# Patient Record
Sex: MALE | Race: BLACK OR AFRICAN AMERICAN | NOT HISPANIC OR LATINO | Employment: UNEMPLOYED | URBAN - METROPOLITAN AREA
[De-identification: names, ages, dates, MRNs, and addresses within clinical notes are randomized per-mention and may not be internally consistent; named-entity substitution may affect disease eponyms.]

---

## 2022-02-02 ENCOUNTER — OFFICE VISIT (OUTPATIENT)
Dept: FAMILY MEDICINE | Facility: CLINIC | Age: 32
End: 2022-02-02
Payer: COMMERCIAL

## 2022-02-02 VITALS
BODY MASS INDEX: 40.74 KG/M2 | DIASTOLIC BLOOD PRESSURE: 100 MMHG | SYSTOLIC BLOOD PRESSURE: 144 MMHG | OXYGEN SATURATION: 97 % | WEIGHT: 291 LBS | HEIGHT: 71 IN | HEART RATE: 89 BPM

## 2022-02-02 DIAGNOSIS — J01.90 ACUTE SINUSITIS, RECURRENCE NOT SPECIFIED, UNSPECIFIED LOCATION: Primary | ICD-10-CM

## 2022-02-02 DIAGNOSIS — R03.0 ELEVATED BP WITHOUT DIAGNOSIS OF HYPERTENSION: ICD-10-CM

## 2022-02-02 DIAGNOSIS — F90.0 ATTENTION DEFICIT HYPERACTIVITY DISORDER (ADHD), PREDOMINANTLY INATTENTIVE TYPE: ICD-10-CM

## 2022-02-02 DIAGNOSIS — E66.01 OBESITY, MORBID (MORE THAN 100 LBS OVER IDEAL WEIGHT OR BMI > 40): ICD-10-CM

## 2022-02-02 DIAGNOSIS — F33.1 MODERATE EPISODE OF RECURRENT MAJOR DEPRESSIVE DISORDER: ICD-10-CM

## 2022-02-02 PROCEDURE — 99999 PR PBB SHADOW E&M-NEW PATIENT-LVL III: ICD-10-PCS | Mod: PBBFAC,,, | Performed by: FAMILY MEDICINE

## 2022-02-02 PROCEDURE — 3077F SYST BP >= 140 MM HG: CPT | Mod: CPTII,S$GLB,, | Performed by: FAMILY MEDICINE

## 2022-02-02 PROCEDURE — 99204 PR OFFICE/OUTPT VISIT, NEW, LEVL IV, 45-59 MIN: ICD-10-PCS | Mod: S$GLB,,, | Performed by: FAMILY MEDICINE

## 2022-02-02 PROCEDURE — 1159F MED LIST DOCD IN RCRD: CPT | Mod: CPTII,S$GLB,, | Performed by: FAMILY MEDICINE

## 2022-02-02 PROCEDURE — 3008F BODY MASS INDEX DOCD: CPT | Mod: CPTII,S$GLB,, | Performed by: FAMILY MEDICINE

## 2022-02-02 PROCEDURE — 1160F RVW MEDS BY RX/DR IN RCRD: CPT | Mod: CPTII,S$GLB,, | Performed by: FAMILY MEDICINE

## 2022-02-02 PROCEDURE — 3080F DIAST BP >= 90 MM HG: CPT | Mod: CPTII,S$GLB,, | Performed by: FAMILY MEDICINE

## 2022-02-02 PROCEDURE — 99999 PR PBB SHADOW E&M-NEW PATIENT-LVL III: CPT | Mod: PBBFAC,,, | Performed by: FAMILY MEDICINE

## 2022-02-02 PROCEDURE — 1160F PR REVIEW ALL MEDS BY PRESCRIBER/CLIN PHARMACIST DOCUMENTED: ICD-10-PCS | Mod: CPTII,S$GLB,, | Performed by: FAMILY MEDICINE

## 2022-02-02 PROCEDURE — 1159F PR MEDICATION LIST DOCUMENTED IN MEDICAL RECORD: ICD-10-PCS | Mod: CPTII,S$GLB,, | Performed by: FAMILY MEDICINE

## 2022-02-02 PROCEDURE — 99204 OFFICE O/P NEW MOD 45 MIN: CPT | Mod: S$GLB,,, | Performed by: FAMILY MEDICINE

## 2022-02-02 PROCEDURE — 3077F PR MOST RECENT SYSTOLIC BLOOD PRESSURE >= 140 MM HG: ICD-10-PCS | Mod: CPTII,S$GLB,, | Performed by: FAMILY MEDICINE

## 2022-02-02 PROCEDURE — 3008F PR BODY MASS INDEX (BMI) DOCUMENTED: ICD-10-PCS | Mod: CPTII,S$GLB,, | Performed by: FAMILY MEDICINE

## 2022-02-02 PROCEDURE — 3080F PR MOST RECENT DIASTOLIC BLOOD PRESSURE >= 90 MM HG: ICD-10-PCS | Mod: CPTII,S$GLB,, | Performed by: FAMILY MEDICINE

## 2022-02-02 RX ORDER — AMOXICILLIN AND CLAVULANATE POTASSIUM 875; 125 MG/1; MG/1
1 TABLET, FILM COATED ORAL EVERY 12 HOURS
Qty: 20 TABLET | Refills: 0 | Status: SHIPPED | OUTPATIENT
Start: 2022-02-02 | End: 2022-02-12

## 2022-02-02 RX ORDER — VENLAFAXINE HYDROCHLORIDE 150 MG/1
150 CAPSULE, EXTENDED RELEASE ORAL DAILY
COMMUNITY
End: 2022-07-25

## 2022-02-02 RX ORDER — DEXTROAMPHETAMINE SACCHARATE, AMPHETAMINE ASPARTATE MONOHYDRATE, DEXTROAMPHETAMINE SULFATE AND AMPHETAMINE SULFATE 7.5; 7.5; 7.5; 7.5 MG/1; MG/1; MG/1; MG/1
30 CAPSULE, EXTENDED RELEASE ORAL EVERY MORNING
COMMUNITY
End: 2022-02-02

## 2022-02-02 NOTE — PROGRESS NOTES
"Subjective:       Patient ID: Gerardo Fernández is a 32 y.o. male.    Chief Complaint: Establish Care    Here today to establish care with a new PCP.  He was late to his appointment and was worked back into the schedule.  He moved to the area from [place] to stay with his mother a few months ago. He recently ended a relationship.  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   He was dx as a teenager with ADHD.  He is on Adderall 30 mg BID.  He has a few weeks left of his prescription.  He is on Effexor for depression for the past 4-6 years.  He has generally worked for him, but he is unsure how well.  He did not do well on lexapro in the past.    Review of Systems   Constitutional: Negative for appetite change, fatigue and fever.   HENT: Positive for congestion (x 1 week, he has been using sudafed), ear pain (better today), rhinorrhea, sinus pressure and sore throat.    Respiratory: Negative for cough, shortness of breath and wheezing.    Cardiovascular: Negative for chest pain and palpitations.   Gastrointestinal: Negative for abdominal pain, constipation, diarrhea, nausea and vomiting.   Genitourinary: Negative for difficulty urinating, dysuria, frequency and hematuria.   Neurological: Negative for dizziness, syncope, weakness and headaches.       Objective:      Vitals:    02/02/22 1111 02/02/22 1128   BP: (!) 142/98 (!) 144/100   BP Location: Right arm Right arm   Patient Position: Sitting Sitting   BP Method: Large (Manual) Large (Manual)   Pulse: 89    SpO2: 97%    Weight: 132 kg (291 lb 0.1 oz)    Height: 5' 11" (1.803 m)       Physical Exam    Assessment:       1. Acute sinusitis, recurrence not specified, unspecified location    2. Elevated BP without diagnosis of hypertension    3. Attention deficit hyperactivity disorder (ADHD), predominantly inattentive type    4. Obesity, morbid (more than 100 lbs over ideal weight or BMI > 40)  "   5. Moderate episode of recurrent major depressive disorder        Plan:       Acute sinusitis, recurrence not specified, unspecified location  -     amoxicillin-clavulanate 875-125mg (AUGMENTIN) 875-125 mg per tablet; Take 1 tablet by mouth every 12 (twelve) hours. for 10 days  Dispense: 20 tablet; Refill: 0  Start Flonase and Mucinex.  Elevated BP without diagnosis of hypertension  Stop sudafed.  Reduce Salt.  Increase CV exercise.  Attention deficit hyperactivity disorder (ADHD), predominantly inattentive type  Stable on Medication.  Does not need refill  Obesity, morbid (more than 100 lbs over ideal weight or BMI > 40)    Moderate episode of recurrent major depressive disorder  Feels like Effexor is working for him at this time  F/U in a couple weeks to recheck BP and refill prescriptions if needed.      Medication List with Changes/Refills   New Medications    AMOXICILLIN-CLAVULANATE 875-125MG (AUGMENTIN) 875-125 MG PER TABLET    Take 1 tablet by mouth every 12 (twelve) hours. for 10 days   Current Medications    VENLAFAXINE (EFFEXOR-XR) 150 MG CP24    Take 150 mg by mouth once daily.   Discontinued Medications    DEXTROAMPHETAMINE-AMPHETAMINE (ADDERALL XR) 30 MG 24 HR CAPSULE    Take 30 mg by mouth every morning.

## 2022-07-11 ENCOUNTER — TELEPHONE (OUTPATIENT)
Dept: FAMILY MEDICINE | Facility: CLINIC | Age: 32
End: 2022-07-11
Payer: COMMERCIAL

## 2022-07-11 RX ORDER — DEXTROAMPHETAMINE SACCHARATE, AMPHETAMINE ASPARTATE, DEXTROAMPHETAMINE SULFATE AND AMPHETAMINE SULFATE 7.5; 7.5; 7.5; 7.5 MG/1; MG/1; MG/1; MG/1
1 TABLET ORAL 2 TIMES DAILY
COMMUNITY
Start: 2022-06-13 | End: 2023-11-08

## 2022-07-11 NOTE — TELEPHONE ENCOUNTER
Spoke to pt. Informed him that he needs a f/u for Adderall refill. He has new insurance and wanted to know if the visit will be approved. Finance dept number was provided.     --Pt also asked if  will push his visit for 6 month instead of 3 months. I explained we have to have a 3 month f/u for Adderall refill. Pt stated well my other provider trusted me and we were friends.     --Pt also asked if his visit can be virtual. I said yes and asked if he is in [place]. He stated No but I can just lie. I stated Im sorry but you cannot do that.  is licensed in [place]. He said well how will yall know. I said Its good to gain trust with your provider and lying to him will not be a way to gain trust. Pt stated I will call back if I figure something out.

## 2022-07-11 NOTE — TELEPHONE ENCOUNTER
----- Message from Majo Skinner sent at 7/11/2022  2:22 PM CDT -----  Type: Patient Call Back         Who called: Pt          What is the request in detail: Pt called in regarding wanting Dr Mcneil to Refill Prescription The Adderall 30Mg  ? and if possible send to [facility] [address] [place] [phone] .          Can the clinic reply by [facility]?no         Would the patient rather a call back or a response via [facility]?call back          Best call back number:[phone] (mobile)          Additional Information: Adderall is not listed in the Pt Chart            Thank You

## 2022-07-25 ENCOUNTER — OFFICE VISIT (OUTPATIENT)
Dept: FAMILY MEDICINE | Facility: CLINIC | Age: 32
End: 2022-07-25
Payer: COMMERCIAL

## 2022-07-25 VITALS
DIASTOLIC BLOOD PRESSURE: 108 MMHG | BODY MASS INDEX: 41.27 KG/M2 | SYSTOLIC BLOOD PRESSURE: 158 MMHG | HEART RATE: 98 BPM | OXYGEN SATURATION: 98 % | WEIGHT: 294.75 LBS | HEIGHT: 71 IN

## 2022-07-25 DIAGNOSIS — E66.01 OBESITY, MORBID (MORE THAN 100 LBS OVER IDEAL WEIGHT OR BMI > 40): ICD-10-CM

## 2022-07-25 DIAGNOSIS — M10.9 ACUTE GOUT OF RIGHT HAND, UNSPECIFIED CAUSE: ICD-10-CM

## 2022-07-25 DIAGNOSIS — I10 BENIGN ESSENTIAL HTN: Primary | ICD-10-CM

## 2022-07-25 DIAGNOSIS — F33.1 MODERATE EPISODE OF RECURRENT MAJOR DEPRESSIVE DISORDER: ICD-10-CM

## 2022-07-25 DIAGNOSIS — F90.0 ATTENTION DEFICIT HYPERACTIVITY DISORDER (ADHD), PREDOMINANTLY INATTENTIVE TYPE: ICD-10-CM

## 2022-07-25 PROCEDURE — 99213 OFFICE O/P EST LOW 20 MIN: CPT | Mod: S$GLB,,, | Performed by: FAMILY MEDICINE

## 2022-07-25 PROCEDURE — 99213 PR OFFICE/OUTPT VISIT, EST, LEVL III, 20-29 MIN: ICD-10-PCS | Mod: S$GLB,,, | Performed by: FAMILY MEDICINE

## 2022-07-25 PROCEDURE — 99999 PR PBB SHADOW E&M-EST. PATIENT-LVL III: ICD-10-PCS | Mod: PBBFAC,,, | Performed by: FAMILY MEDICINE

## 2022-07-25 PROCEDURE — 99999 PR PBB SHADOW E&M-EST. PATIENT-LVL III: CPT | Mod: PBBFAC,,, | Performed by: FAMILY MEDICINE

## 2022-07-25 RX ORDER — SERTRALINE HYDROCHLORIDE 100 MG/1
100 TABLET, FILM COATED ORAL DAILY
Qty: 30 TABLET | Refills: 1 | Status: SHIPPED | OUTPATIENT
Start: 2022-07-25 | End: 2022-09-23 | Stop reason: SDUPTHER

## 2022-07-25 RX ORDER — LISINOPRIL 20 MG/1
20 TABLET ORAL DAILY
Qty: 30 TABLET | Refills: 1 | Status: SHIPPED | OUTPATIENT
Start: 2022-07-25 | End: 2022-09-23 | Stop reason: SDUPTHER

## 2022-07-25 RX ORDER — COLCHICINE 0.6 MG/1
TABLET ORAL
Qty: 30 TABLET | Refills: 1 | Status: SHIPPED | OUTPATIENT
Start: 2022-07-25 | End: 2024-03-14

## 2022-07-25 RX ORDER — ALLOPURINOL 300 MG/1
300 TABLET ORAL DAILY
Qty: 30 TABLET | Refills: 1 | Status: SHIPPED | OUTPATIENT
Start: 2022-07-25 | End: 2022-08-24

## 2022-07-25 NOTE — PROGRESS NOTES
"Subjective:       Patient ID: Gerardo Fernández is a 32 y.o. male.    Chief Complaint: Med Change Follow Up and gout on right small finger     Here today to follow up on ADHD.  He was last in the office in Feb.  He is currently not working and is without health insurance.   He was on adderall 30 mg BID, which he takes rarely.  He last took it last week.  He was on Effexor and ran out.  Now that he is out, he is unsure how it worked for him.  He has been on Lexapro in the past.     He is c/o a "gout" attack to his right 5th finger for 1-2 weeks, feels it is improving now.  He has been using Ibuprofen.    Review of Systems   Constitutional: Negative for activity change and unexpected weight change.   HENT: Negative for hearing loss, rhinorrhea and trouble swallowing.    Eyes: Negative for discharge and visual disturbance.   Respiratory: Negative for chest tightness and wheezing.    Cardiovascular: Negative for chest pain and palpitations.   Gastrointestinal: Negative for blood in stool, constipation, diarrhea and vomiting.   Endocrine: Negative for polydipsia and polyuria.   Genitourinary: Negative for difficulty urinating, hematuria and urgency.   Musculoskeletal: Positive for arthralgias and joint swelling. Negative for neck pain.   Neurological: Negative for weakness and headaches.   Psychiatric/Behavioral: Positive for dysphoric mood. Negative for confusion.       Objective:      Vitals:    07/25/22 1551   BP: (!) 158/108   Pulse: 98   SpO2: 98%   Weight: 133.7 kg (294 lb 12.1 oz)   Height: 5' 11" (1.803 m)      Physical Exam  Constitutional:       General: He is not in acute distress.     Appearance: He is obese.   Cardiovascular:      Rate and Rhythm: Normal rate and regular rhythm.      Heart sounds: Normal heart sounds. No murmur heard.  Pulmonary:      Effort: Pulmonary effort is normal. No respiratory distress.      Breath sounds: Normal breath sounds. No wheezing, rhonchi or rales.   Musculoskeletal:      " Right hand: Swelling (5th digit with mild erythema to PIP) present.   Skin:     General: Skin is warm and dry.   Neurological:      General: No focal deficit present.      Mental Status: He is alert.   Psychiatric:         Mood and Affect: Mood normal.         Behavior: Behavior normal.         Thought Content: Thought content normal.         No results found for this or any previous visit.   Assessment:       1. Benign essential HTN    2. Obesity, morbid (more than 100 lbs over ideal weight or BMI > 40)    3. Attention deficit hyperactivity disorder (ADHD), predominantly inattentive type    4. Acute gout of right hand, unspecified cause    5. Moderate episode of recurrent major depressive disorder        Plan:       Benign essential HTN  -     lisinopriL (PRINIVIL,ZESTRIL) 20 MG tablet; Take 1 tablet (20 mg total) by mouth once daily.  Dispense: 30 tablet; Refill: 1  BP is again markedly elevated.  Discussed dietary changes, increased cardiovascular exercise and weight loss.  Start Lisinopril today.    Obesity, morbid (more than 100 lbs over ideal weight or BMI > 40)  Work on weight loss    Attention deficit hyperactivity disorder (ADHD), predominantly inattentive type  He takes medication rarely at this time.  He is also not working at this time.  Due to his elevated BP, I am not comfortable prescribing medication at this time.  Will re-evaluate when his BP is better controlled.    Acute gout of right hand, unspecified cause  -     allopurinoL (ZYLOPRIM) 300 MG tablet; Take 1 tablet (300 mg total) by mouth once daily.  Dispense: 30 tablet; Refill: 1  -     colchicine (COLCRYS) 0.6 mg tablet; 1.2 mg PO x 1, then 0.6 mg PO 1h later x 1.  Dispense: 30 tablet; Refill: 1  He has had recurrent gout attacks, start Allopurinol with Colcrys PRN    Moderate episode of recurrent major depressive disorder  -     sertraline (ZOLOFT) 100 MG tablet; Take 1 tablet (100 mg total) by mouth once daily.  Dispense: 30 tablet; Refill:  1  Change Effexor to Zoloft today.      F/U in 1 month.        Medication List with Changes/Refills   New Medications    ALLOPURINOL (ZYLOPRIM) 300 MG TABLET    Take 1 tablet (300 mg total) by mouth once daily.    COLCHICINE (COLCRYS) 0.6 MG TABLET    1.2 mg PO x 1, then 0.6 mg PO 1h later x 1.    LISINOPRIL (PRINIVIL,ZESTRIL) 20 MG TABLET    Take 1 tablet (20 mg total) by mouth once daily.    SERTRALINE (ZOLOFT) 100 MG TABLET    Take 1 tablet (100 mg total) by mouth once daily.   Current Medications    DEXTROAMPHETAMINE-AMPHETAMINE 30 MG TAB    Take 1 tablet by mouth 2 (two) times daily.   Discontinued Medications    VENLAFAXINE (EFFEXOR-XR) 150 MG CP24    Take 150 mg by mouth once daily.

## 2022-07-26 ENCOUNTER — DOCUMENTATION ONLY (OUTPATIENT)
Dept: FAMILY MEDICINE | Facility: CLINIC | Age: 32
End: 2022-07-26
Payer: COMMERCIAL

## 2022-07-26 NOTE — PROGRESS NOTES
Gerardo Fernández Hong: [id number] - [place] Case ID: [id number] - Rx #: [id number]    Status  Sent to Plan today- Denied    Reason: Per their guideline for formulary exception - pt has to have tried and failed at least two of the preferred drugs that are in the same drug class.      Formulary alternatives for your pt may include but are not limited to indomethacin, ibuprofen, naproxen and prednisone    Drug  Colchicine 0.6MG tablets  Form  [facility] ePA Form 2017 N[employer]- [facility]

## 2022-07-27 ENCOUNTER — TELEPHONE (OUTPATIENT)
Dept: FAMILY MEDICINE | Facility: CLINIC | Age: 32
End: 2022-07-27
Payer: COMMERCIAL

## 2022-07-27 DIAGNOSIS — M10.9 ACUTE GOUT OF RIGHT HAND, UNSPECIFIED CAUSE: Primary | ICD-10-CM

## 2022-07-27 RX ORDER — INDOMETHACIN 50 MG/1
50 CAPSULE ORAL 3 TIMES DAILY PRN
Qty: 30 CAPSULE | Refills: 1 | Status: SHIPPED | OUTPATIENT
Start: 2022-07-27 | End: 2022-09-23

## 2022-07-27 NOTE — TELEPHONE ENCOUNTER
[facility] has denied coverage of Colchicine 0.6 mg.    Reason: Per their guideline for formulary exception - pt has to have tried and failed at least two of the preferred drugs that are in the same drug class.     Formulary alternatives for your pt may include but are not limited to indomethacin, ibuprofen, naproxen and prednisone.

## 2022-07-27 NOTE — TELEPHONE ENCOUNTER
----- Message from Flower Marquez sent at 7/26/2022  4:16 PM CDT -----  Patient Call Back    Who Called: PT     What is the reqeust in detail: pt calling to speak with someone regarding his medication. Pls advise.    Can the clinic reply by [facility]?    Best Call Back Number: [phone]

## 2022-07-28 ENCOUNTER — DOCUMENTATION ONLY (OUTPATIENT)
Dept: FAMILY MEDICINE | Facility: CLINIC | Age: 32
End: 2022-07-28

## 2022-07-28 ENCOUNTER — PATIENT MESSAGE (OUTPATIENT)
Dept: FAMILY MEDICINE | Facility: CLINIC | Age: 32
End: 2022-07-28
Payer: COMMERCIAL

## 2022-07-28 NOTE — PROGRESS NOTES
PA initiated in [facility] Indomethacin 50 mg  KEY:[id number]  RX # [id number]  [facility]    Outcome: pt's pharmacy coverage has .

## 2022-07-28 NOTE — TELEPHONE ENCOUNTER
Insurance is requiring a PA on Indomethacin even though the insurer listed Indomethacin as one of the preferred formulary drug. PA submitted this morning- pending approval.

## 2022-07-28 NOTE — TELEPHONE ENCOUNTER
Left a message via pt's voice mail to contact the office.    Prior authorization outcome: patient's pharmacy coverage has .

## 2022-08-19 ENCOUNTER — PATIENT MESSAGE (OUTPATIENT)
Dept: ADMINISTRATIVE | Facility: HOSPITAL | Age: 32
End: 2022-08-19
Payer: COMMERCIAL

## 2022-09-09 ENCOUNTER — PATIENT OUTREACH (OUTPATIENT)
Dept: ADMINISTRATIVE | Facility: HOSPITAL | Age: 32
End: 2022-09-09
Payer: COMMERCIAL

## 2022-09-09 NOTE — PROGRESS NOTES
Pre-Visit Chart Review  For Appointment Scheduled on 9/23/2022    Health Maintenance Due   Topic    Hepatitis C Screening     Lipid Panel     Pneumococcal Vaccines (Age 0-64) (1 - PCV)    HIV Screening     TETANUS VACCINE     COVID-19 Vaccine (2 - Pfizer series)    Influenza Vaccine (1)

## 2022-09-14 DIAGNOSIS — I10 BENIGN ESSENTIAL HTN: ICD-10-CM

## 2022-09-23 ENCOUNTER — OFFICE VISIT (OUTPATIENT)
Dept: FAMILY MEDICINE | Facility: CLINIC | Age: 32
End: 2022-09-23
Payer: COMMERCIAL

## 2022-09-23 VITALS
DIASTOLIC BLOOD PRESSURE: 108 MMHG | OXYGEN SATURATION: 98 % | HEART RATE: 103 BPM | BODY MASS INDEX: 40.56 KG/M2 | WEIGHT: 290.81 LBS | SYSTOLIC BLOOD PRESSURE: 184 MMHG

## 2022-09-23 DIAGNOSIS — F90.0 ATTENTION DEFICIT HYPERACTIVITY DISORDER (ADHD), PREDOMINANTLY INATTENTIVE TYPE: ICD-10-CM

## 2022-09-23 DIAGNOSIS — I10 BENIGN ESSENTIAL HTN: Primary | ICD-10-CM

## 2022-09-23 DIAGNOSIS — E66.01 OBESITY, MORBID (MORE THAN 100 LBS OVER IDEAL WEIGHT OR BMI > 40): ICD-10-CM

## 2022-09-23 DIAGNOSIS — M10.9 ACUTE GOUT OF RIGHT HAND, UNSPECIFIED CAUSE: ICD-10-CM

## 2022-09-23 DIAGNOSIS — F33.1 MODERATE EPISODE OF RECURRENT MAJOR DEPRESSIVE DISORDER: ICD-10-CM

## 2022-09-23 PROCEDURE — 99214 PR OFFICE/OUTPT VISIT, EST, LEVL IV, 30-39 MIN: ICD-10-PCS | Mod: S$GLB,,, | Performed by: FAMILY MEDICINE

## 2022-09-23 PROCEDURE — 99999 PR PBB SHADOW E&M-EST. PATIENT-LVL III: CPT | Mod: PBBFAC,,, | Performed by: FAMILY MEDICINE

## 2022-09-23 PROCEDURE — 99214 OFFICE O/P EST MOD 30 MIN: CPT | Mod: S$GLB,,, | Performed by: FAMILY MEDICINE

## 2022-09-23 PROCEDURE — 99999 PR PBB SHADOW E&M-EST. PATIENT-LVL III: ICD-10-PCS | Mod: PBBFAC,,, | Performed by: FAMILY MEDICINE

## 2022-09-23 RX ORDER — ALLOPURINOL 300 MG/1
300 TABLET ORAL DAILY
Qty: 30 TABLET | Refills: 11 | Status: SHIPPED | OUTPATIENT
Start: 2022-09-23 | End: 2023-09-23

## 2022-09-23 RX ORDER — LISINOPRIL 20 MG/1
20 TABLET ORAL DAILY
Qty: 30 TABLET | Refills: 11 | Status: SHIPPED | OUTPATIENT
Start: 2022-09-23 | End: 2023-11-08

## 2022-09-23 RX ORDER — ALLOPURINOL 300 MG/1
300 TABLET ORAL
COMMUNITY
Start: 2022-08-24 | End: 2022-09-23 | Stop reason: SDUPTHER

## 2022-09-23 RX ORDER — SERTRALINE HYDROCHLORIDE 100 MG/1
100 TABLET, FILM COATED ORAL DAILY
Qty: 30 TABLET | Refills: 11 | Status: SHIPPED | OUTPATIENT
Start: 2022-09-23 | End: 2023-11-08 | Stop reason: SDUPTHER

## 2022-09-23 NOTE — PROGRESS NOTES
Subjective:       Patient ID: Gerardo Fernández is a 32 y.o. male.    Chief Complaint: Hypertension    Here today to follow up on chronic medical conditions.     HTN:  At his last visit, we started him on Lisinopril 20 mg.  He took the medication without issues, but stopped it 2 days ago.  He was unable to take his BP.    Gout:  He is now on Allopurinol.    Depression:  His Effexor was changed to Zoloft at his last visit.  He feels like the Zoloft is working better then the Effexor.  Overall his mood is better.    Hypertension  Pertinent negatives include no chest pain, headaches, neck pain or palpitations.   Review of Systems   Constitutional:  Negative for activity change and unexpected weight change.   HENT:  Negative for hearing loss, rhinorrhea and trouble swallowing.    Eyes:  Negative for discharge and visual disturbance.   Respiratory:  Negative for chest tightness and wheezing.    Cardiovascular:  Negative for chest pain and palpitations.   Gastrointestinal:  Negative for blood in stool, constipation, diarrhea and vomiting.   Endocrine: Negative for polydipsia and polyuria.   Genitourinary:  Negative for difficulty urinating, hematuria and urgency.   Musculoskeletal:  Negative for arthralgias, joint swelling and neck pain.   Neurological:  Negative for weakness and headaches.   Psychiatric/Behavioral:  Negative for confusion and dysphoric mood.      Objective:      Vitals:    09/23/22 1545   BP: (!) 170/110   BP Location: Left arm   Patient Position: Sitting   Pulse: 103   SpO2: 98%   Weight: 131.9 kg (290 lb 12.6 oz)      Physical Exam  Constitutional:       General: He is not in acute distress.     Appearance: He is obese.   Cardiovascular:      Rate and Rhythm: Normal rate and regular rhythm.      Heart sounds: Normal heart sounds. No murmur heard.  Pulmonary:      Effort: Pulmonary effort is normal. No respiratory distress.      Breath sounds: Normal breath sounds. No wheezing, rhonchi or rales.    Skin:     General: Skin is warm and dry.   Neurological:      General: No focal deficit present.      Mental Status: He is alert.   Psychiatric:         Mood and Affect: Mood normal.         Behavior: Behavior normal.         Thought Content: Thought content normal.       No results found for this or any previous visit.   Assessment:       1. Benign essential HTN    2. Obesity, morbid (more than 100 lbs over ideal weight or BMI > 40)    3. Attention deficit hyperactivity disorder (ADHD), predominantly inattentive type    4. Acute gout of right hand, unspecified cause          Plan:       Benign essential HTN  Restart Lisinopril.  NV in 1-2 weeks to recheck BP on medication.  If under 140/90, we can restart his adderall.   Obesity, morbid (more than 100 lbs over ideal weight or BMI > 40)    Attention deficit hyperactivity disorder (ADHD), predominantly inattentive type    Acute gout of right hand, unspecified cause        Medication List with Changes/Refills   Current Medications    ALLOPURINOL (ZYLOPRIM) 300 MG TABLET    Take 300 mg by mouth.    COLCHICINE (COLCRYS) 0.6 MG TABLET    1.2 mg PO x 1, then 0.6 mg PO 1h later x 1.    DEXTROAMPHETAMINE-AMPHETAMINE 30 MG TAB    Take 1 tablet by mouth 2 (two) times daily.    INDOMETHACIN (INDOCIN) 50 MG CAPSULE    Take 1 capsule (50 mg total) by mouth 3 (three) times daily as needed (gout).    LISINOPRIL (PRINIVIL,ZESTRIL) 20 MG TABLET    Take 1 tablet (20 mg total) by mouth once daily.    SERTRALINE (ZOLOFT) 100 MG TABLET    Take 1 tablet (100 mg total) by mouth once daily.

## 2022-10-28 ENCOUNTER — PATIENT MESSAGE (OUTPATIENT)
Dept: ADMINISTRATIVE | Facility: HOSPITAL | Age: 32
End: 2022-10-28
Payer: COMMERCIAL

## 2023-03-10 ENCOUNTER — PATIENT MESSAGE (OUTPATIENT)
Dept: ADMINISTRATIVE | Facility: HOSPITAL | Age: 33
End: 2023-03-10
Payer: COMMERCIAL

## 2023-11-07 ENCOUNTER — PATIENT MESSAGE (OUTPATIENT)
Dept: FAMILY MEDICINE | Facility: CLINIC | Age: 33
End: 2023-11-07

## 2023-11-08 ENCOUNTER — OFFICE VISIT (OUTPATIENT)
Dept: FAMILY MEDICINE | Facility: CLINIC | Age: 33
End: 2023-11-08
Payer: COMMERCIAL

## 2023-11-08 DIAGNOSIS — I10 BENIGN ESSENTIAL HTN: Primary | ICD-10-CM

## 2023-11-08 DIAGNOSIS — F41.9 ANXIETY: ICD-10-CM

## 2023-11-08 DIAGNOSIS — F33.1 MODERATE EPISODE OF RECURRENT MAJOR DEPRESSIVE DISORDER: ICD-10-CM

## 2023-11-08 DIAGNOSIS — M10.9 GOUT, UNSPECIFIED CAUSE, UNSPECIFIED CHRONICITY, UNSPECIFIED SITE: ICD-10-CM

## 2023-11-08 PROBLEM — F10.11 HISTORY OF ETOH ABUSE: Status: ACTIVE | Noted: 2023-11-08

## 2023-11-08 PROCEDURE — 99213 PR OFFICE/OUTPT VISIT, EST, LEVL III, 20-29 MIN: ICD-10-PCS | Mod: 95,,, | Performed by: FAMILY MEDICINE

## 2023-11-08 PROCEDURE — 99213 OFFICE O/P EST LOW 20 MIN: CPT | Mod: 95,,, | Performed by: FAMILY MEDICINE

## 2023-11-08 RX ORDER — HYDROCHLOROTHIAZIDE 12.5 MG/1
1 CAPSULE ORAL DAILY
COMMUNITY
Start: 2023-10-21 | End: 2024-03-14

## 2023-11-08 RX ORDER — SERTRALINE HYDROCHLORIDE 100 MG/1
150 TABLET, FILM COATED ORAL DAILY
Qty: 45 TABLET | Refills: 3 | Status: SHIPPED | OUTPATIENT
Start: 2023-11-08 | End: 2024-11-07

## 2023-11-08 RX ORDER — ALLOPURINOL 300 MG/1
1 TABLET ORAL DAILY
COMMUNITY
Start: 2023-10-21

## 2023-11-08 NOTE — PROGRESS NOTES
Subjective:       Patient ID: Gerardo Fernández is a 33 y.o. male.    Chief Complaint: Anxiety    The patient location is: [place]  The chief complaint leading to consultation is: CAROL    Visit type: audiovisual    Face to Face time with patient: 15  20 minutes of total time spent on the encounter, which includes face to face time and non-face to face time preparing to see the patient (eg, review of tests), Obtaining and/or reviewing separately obtained history, Documenting clinical information in the electronic or other health record, Independently interpreting results (not separately reported) and communicating results to the patient/family/caregiver, or Care coordination (not separately reported).     Each patient to whom he or she provides medical services by telemedicine is:  (1) informed of the relationship between the physician and patient and the respective role of any other health care provider with respect to management of the patient; and (2) notified that he or she may decline to receive medical services by telemedicine and may withdraw from such care at any time.    Here today to follow up on chronic medical conditions.  Last seen here in Sept 2022.  Was seen in [place] in July 2023    CAROL/MDD:  he is on Zoloft 100.  He has been doing well on it overall, but still has anxiety.  He is about to run out of the medication and needs a refill.  He states he has had issues with alcohol and will use it to help with his anxiety.    HTN:  ran out of lisinopril.  Started on HCTZ in [place]   Gout:  on Allopurinol.      Anxiety  Patient reports no chest pain or palpitations.         Review of Systems   Constitutional:  Positive for activity change. Negative for unexpected weight change.   HENT:  Negative for hearing loss, rhinorrhea and trouble swallowing.    Eyes:  Negative for discharge and visual disturbance.   Respiratory:  Negative for chest tightness and wheezing.    Cardiovascular:  Negative for chest  pain and palpitations.   Gastrointestinal:  Negative for blood in stool, constipation, diarrhea and vomiting.   Endocrine: Negative for polydipsia and polyuria.   Genitourinary:  Negative for difficulty urinating, hematuria and urgency.   Musculoskeletal:  Negative for arthralgias, joint swelling and neck pain.   Neurological:  Negative for weakness and headaches.       Objective:      There were no vitals filed for this visit.   Physical Exam  Constitutional:       Appearance: Normal appearance.   Neurological:      General: No focal deficit present.      Mental Status: He is alert and oriented to person, place, and time.   Psychiatric:         Mood and Affect: Mood normal.         Behavior: Behavior normal.         Thought Content: Thought content normal.         Judgment: Judgment normal.         No results found for this or any previous visit.   Assessment:       1. Benign essential HTN    2. Moderate episode of recurrent major depressive disorder    3. Gout, unspecified cause, unspecified chronicity, unspecified site    4. Anxiety        Plan:       Benign essential HTN    Moderate episode of recurrent major depressive disorder  -     sertraline (ZOLOFT) 100 MG tablet; Take 1.5 tablets (150 mg total) by mouth once daily.  Dispense: 45 tablet; Refill: 3    Gout, unspecified cause, unspecified chronicity, unspecified site    Anxiety    Increase Zoloft to 150 mg at this time.  Will f/u in a few months in office.      Medication List with Changes/Refills   Current Medications    ALLOPURINOL (ZYLOPRIM) 300 MG TABLET    Take 1 tablet by mouth once daily.    COLCHICINE (COLCRYS) 0.6 MG TABLET    1.2 mg PO x 1, then 0.6 mg PO 1h later x 1.    HYDROCHLOROTHIAZIDE (MICROZIDE) 12.5 MG CAPSULE    Take 1 capsule by mouth once daily.   Changed and/or Refilled Medications    Modified Medication Previous Medication    SERTRALINE (ZOLOFT) 100 MG TABLET sertraline (ZOLOFT) 100 MG tablet       Take 1.5 tablets (150 mg total) by  mouth once daily.    Take 1 tablet (100 mg total) by mouth once daily.   Discontinued Medications    DEXTROAMPHETAMINE-AMPHETAMINE 30 MG TAB    Take 1 tablet by mouth 2 (two) times daily.    LISINOPRIL (PRINIVIL,ZESTRIL) 20 MG TABLET    Take 1 tablet (20 mg total) by mouth once daily.

## 2023-11-16 ENCOUNTER — PATIENT OUTREACH (OUTPATIENT)
Dept: ADMINISTRATIVE | Facility: HOSPITAL | Age: 33
End: 2023-11-16

## 2023-11-16 ENCOUNTER — TELEPHONE (OUTPATIENT)
Dept: FAMILY MEDICINE | Facility: CLINIC | Age: 33
End: 2023-11-16

## 2023-11-16 NOTE — TELEPHONE ENCOUNTER
Outreach to pt for MOP BP   Pt declined NV for BP check as states no insurance  Suggested BP check at [facility], [facility], etc  He states he will do this and let us know if his bp continues to be elevated

## 2023-11-16 NOTE — PROGRESS NOTES
Population Health Chart Review & Patient Outreach Details    Outreach Performed: NO    Additional Pop Health Notes:           Updates Requested / Reviewed:      Updated Care Coordination Note and Immunizations Reconciliation Completed or Queried: [place]         Health Maintenance Topics Overdue:    Health Maintenance Due   Topic Date Due    Pneumococcal Vaccines (Age 0-64) (1 - PCV) Never done    HIV Screening  Never done    Influenza Vaccine (1) Never done    COVID-19 Vaccine (3 - 2023-24 season) 09/01/2023         Health Maintenance Topic(s) Outreach Outcomes & Actions Taken:    Blood Pressure - Outreach Outcomes & Actions Taken  : Patient Declined Remote Reading or Scheduling Appt - Escalated to PCP

## 2024-03-14 ENCOUNTER — OFFICE VISIT (OUTPATIENT)
Dept: FAMILY MEDICINE | Facility: CLINIC | Age: 34
End: 2024-03-14
Payer: COMMERCIAL

## 2024-03-14 VITALS
HEART RATE: 89 BPM | SYSTOLIC BLOOD PRESSURE: 164 MMHG | DIASTOLIC BLOOD PRESSURE: 108 MMHG | OXYGEN SATURATION: 98 % | BODY MASS INDEX: 42.41 KG/M2 | WEIGHT: 302.94 LBS | HEIGHT: 71 IN

## 2024-03-14 DIAGNOSIS — F33.1 MODERATE EPISODE OF RECURRENT MAJOR DEPRESSIVE DISORDER: ICD-10-CM

## 2024-03-14 DIAGNOSIS — M10.9 GOUT, UNSPECIFIED CAUSE, UNSPECIFIED CHRONICITY, UNSPECIFIED SITE: ICD-10-CM

## 2024-03-14 DIAGNOSIS — F41.9 ANXIETY: ICD-10-CM

## 2024-03-14 DIAGNOSIS — F10.11 HISTORY OF ETOH ABUSE: ICD-10-CM

## 2024-03-14 DIAGNOSIS — I10 BENIGN ESSENTIAL HTN: ICD-10-CM

## 2024-03-14 DIAGNOSIS — Z00.00 WELLNESS EXAMINATION: Primary | ICD-10-CM

## 2024-03-14 DIAGNOSIS — Z23 IMMUNIZATION DUE: ICD-10-CM

## 2024-03-14 DIAGNOSIS — F90.0 ATTENTION DEFICIT HYPERACTIVITY DISORDER (ADHD), PREDOMINANTLY INATTENTIVE TYPE: ICD-10-CM

## 2024-03-14 DIAGNOSIS — Z96.643 HISTORY OF BILATERAL HIP ARTHROPLASTY: ICD-10-CM

## 2024-03-14 DIAGNOSIS — E66.01 OBESITY, MORBID (MORE THAN 100 LBS OVER IDEAL WEIGHT OR BMI > 40): ICD-10-CM

## 2024-03-14 PROCEDURE — 1159F MED LIST DOCD IN RCRD: CPT | Mod: CPTII,S$GLB,, | Performed by: FAMILY MEDICINE

## 2024-03-14 PROCEDURE — 99395 PREV VISIT EST AGE 18-39: CPT | Mod: S$GLB,,, | Performed by: FAMILY MEDICINE

## 2024-03-14 PROCEDURE — 4010F ACE/ARB THERAPY RXD/TAKEN: CPT | Mod: CPTII,S$GLB,, | Performed by: FAMILY MEDICINE

## 2024-03-14 PROCEDURE — 3077F SYST BP >= 140 MM HG: CPT | Mod: CPTII,S$GLB,, | Performed by: FAMILY MEDICINE

## 2024-03-14 PROCEDURE — 99999 PR PBB SHADOW E&M-EST. PATIENT-LVL III: CPT | Mod: PBBFAC,,, | Performed by: FAMILY MEDICINE

## 2024-03-14 PROCEDURE — 3008F BODY MASS INDEX DOCD: CPT | Mod: CPTII,S$GLB,, | Performed by: FAMILY MEDICINE

## 2024-03-14 PROCEDURE — 3080F DIAST BP >= 90 MM HG: CPT | Mod: CPTII,S$GLB,, | Performed by: FAMILY MEDICINE

## 2024-03-14 RX ORDER — DEXTROAMPHETAMINE SACCHARATE, AMPHETAMINE ASPARTATE, DEXTROAMPHETAMINE SULFATE AND AMPHETAMINE SULFATE 5; 5; 5; 5 MG/1; MG/1; MG/1; MG/1
1 TABLET ORAL DAILY
COMMUNITY
Start: 2005-09-11

## 2024-03-14 RX ORDER — OLMESARTAN MEDOXOMIL 20 MG/1
20 TABLET ORAL DAILY
Qty: 90 TABLET | Refills: 3 | Status: SHIPPED | OUTPATIENT
Start: 2024-03-14 | End: 2025-03-14

## 2024-03-14 RX ORDER — AMLODIPINE BESYLATE 10 MG/1
10 TABLET ORAL NIGHTLY
Qty: 90 TABLET | Refills: 3 | Status: SHIPPED | OUTPATIENT
Start: 2024-03-14 | End: 2025-03-14

## 2024-03-14 RX ORDER — BUPROPION HYDROCHLORIDE 100 MG/1
100 TABLET, EXTENDED RELEASE ORAL 2 TIMES DAILY
Qty: 60 TABLET | Refills: 11 | Status: SHIPPED | OUTPATIENT
Start: 2024-03-14 | End: 2025-03-14

## 2024-03-14 NOTE — PROGRESS NOTES
THIS DOCUMENT WAS MADE IN PART WITH VOICE RECOGNITION SOFTWARE.  OCCASIONALLY THIS SOFTWARE WILL MISINTERPRET WORDS OR PHRASES.    Assessment and Plan:    1. Wellness examination  CBC Auto Differential    Comprehensive Metabolic Panel    Lipid Panel    Hemoglobin A1C    TSH    T4, Free    Uric Acid    Urinalysis    Urinalysis Microscopic    EKG 12-lead      2. Immunization due        3. Attention deficit hyperactivity disorder (ADHD), predominantly inattentive type        4. Moderate episode of recurrent major depressive disorder  buPROPion (WELLBUTRIN SR) 100 MG TBSR 12 hr tablet      5. Anxiety  buPROPion (WELLBUTRIN SR) 100 MG TBSR 12 hr tablet      6. History of ETOH abuse        7. Benign essential HTN  olmesartan (BENICAR) 20 MG tablet    amLODIPine (NORVASC) 10 MG tablet      8. Obesity, morbid (more than 100 lbs over ideal weight or BMI > 40)        9. Gout, unspecified cause, unspecified chronicity, unspecified site        10. History of bilateral hip arthroplasty - osteonecrosis            Wellness labs as above     Two new blood pressure medicines     New medication for anxiety depression     Follow-up in 4 weeks    ______________________________________________________________________  Subjective:    Chief Complaint:  Chief Complaint   Patient presents with    Hypertension    ADHD    Establish Care        HPI:  Gerardo is a 34 y.o. year old     Patient presents today to establish care     Noted to have significantly elevated blood pressure   Currently on low-dose hydrochlorothiazide despite previous diagnosis of gouty arthritis   Blood pressure is consistently elevated through all appointments   Denies any exertional chest discomfort shortness of breath     Patient also complains of uncontrolled depression anxiety   Currently on Zoloft 150 mg   Has tried several other medications, see list below       Essential hypertension  Rx-hydrochlorothiazide 12.5 mg daily  No CP / SOB     Depression, anxiety  "  Rx-Zoloft 150 mg daily  Prev rx : Lexapro (?), Prozac (?), Effexor (ineffective)  Mildly symptomatic but close to "the ledge"   No current SI     History of alcohol abuse   Correlates with depression / anxiety   Current intake : 6 pack 3 x per week   Why? : pt unsure / ? Social anxiety    ADHD   Prev Rx-Adderall (worked well)     History of gouty arthritis   Previous gout flare- > 1 year   Rx-allopurinol 300 mg    Weight management  Max weight-302 lb   Current weight 302 lb  Job : Working in wear house   Exercise : [employer] Bell   Diet : Monitoring calorie intake    Nicotine Use   Vaping    [place] JESSE 2017 2/2 osteonecrosis   Limits exercise due to concern of wearing joint out.                 Past Medical History:  No past medical history on file.    Past Surgical History:  No past surgical history on file.    Family History:  No family history on file.    Social History:  Social History     Socioeconomic History    Marital status: Single   Tobacco Use    Smoking status: Every Day     Types: Vaping with nicotine    Smokeless tobacco: Never   Substance and Sexual Activity    Alcohol use: Never    Drug use: Yes     Types: Marijuana    Sexual activity: Not Currently     Social Determinants of Health     Financial Resource Strain: High Risk (3/11/2024)    Overall Financial Resource Strain (CARDIA)     Difficulty of Paying Living Expenses: Very hard   Food Insecurity: Food Insecurity Present (3/11/2024)    Hunger Vital Sign     Worried About Running Out of Food in the Last Year: Often true     Ran Out of Food in the Last Year: Often true   Transportation Needs: No Transportation Needs (3/11/2024)    PRAPARE - Transportation     Lack of Transportation (Medical): No     Lack of Transportation (Non-Medical): No   Physical Activity: Sufficiently Active (3/11/2024)    Exercise Vital Sign     Days of Exercise per Week: 4 days     Minutes of Exercise per Session: 60 min   Stress: Stress Concern Present (3/11/2024)    [place] citizen " "[place] of Occupational [employer] - Occupational Stress Questionnaire     Feeling of Stress : Rather much   Social Connections: Unknown (3/11/2024)    Social Connection and Isolation Panel [NHANES]     Frequency of Communication with Friends and Family: Three times a week     Frequency of Social Gatherings with Friends and Family: Three times a week     Active Member of Clubs or Organizations: No     Attends Club or Organization Meetings: Never     Marital Status: Never    Housing Stability: High Risk (3/11/2024)    Housing Stability Vital Sign     Unable to Pay for Housing in the Last Year: Yes     Number of Places Lived in the Last Year: 2     Unstable Housing in the Last Year: No       Medications:  Current Outpatient Medications on File Prior to Visit   Medication Sig Dispense Refill    allopurinoL (ZYLOPRIM) 300 MG tablet Take 1 tablet by mouth once daily.      hydroCHLOROthiazide (MICROZIDE) 12.5 mg capsule Take 1 capsule by mouth once daily.      sertraline (ZOLOFT) 100 MG tablet Take 1.5 tablets (150 mg total) by mouth once daily. 45 tablet 3    colchicine (COLCRYS) 0.6 mg tablet 1.2 mg PO x 1, then 0.6 mg PO 1h later x 1. (Patient not taking: Reported on 3/14/2024) 30 tablet 1    dextroamphetamine-amphetamine (ADDERALL) 20 mg tablet Take 1 tablet by mouth once daily.       No current facility-administered medications on file prior to visit.       Allergies:  Patient has no known allergies.    Immunizations:  Immunization History   Administered Date(s) Administered    COVID-19, MRNA, LN-S, PF (Pfizer) (Purple Cap) 12/01/2021    COVID-19, vector-nr, rS-Ad26, PF (Addictive) 06/18/2021    Tdap 12/31/2015       Review of Systems:  Review of Systems   All other systems reviewed and are negative.      Objective:    Vitals:  Vitals:    03/14/24 1423   BP: (!) 164/108   Pulse: 89   SpO2: 98%   Weight: (!) 137.4 kg (302 lb 14.6 oz)   Height: 5' 11" (1.803 m)   PainSc: 0-No pain       Physical Exam  Vitals " reviewed.   Constitutional:       General: He is not in acute distress.  HENT:      Head: Normocephalic and atraumatic.   Eyes:      Pupils: Pupils are equal, round, and reactive to light.   Cardiovascular:      Rate and Rhythm: Normal rate and regular rhythm.      Heart sounds: No murmur heard.     No friction rub.   Pulmonary:      Effort: Pulmonary effort is normal.      Breath sounds: Normal breath sounds.   Abdominal:      General: Bowel sounds are normal. There is no distension.      Palpations: Abdomen is soft.      Tenderness: There is no abdominal tenderness.   Musculoskeletal:      Cervical back: Neck supple.   Skin:     General: Skin is warm and dry.      Findings: No rash.   Psychiatric:         Behavior: Behavior normal.           Jones Todd MD  Family Medicine

## 2024-04-04 ENCOUNTER — PATIENT MESSAGE (OUTPATIENT)
Dept: ADMINISTRATIVE | Facility: HOSPITAL | Age: 34
End: 2024-04-04
Payer: COMMERCIAL

## 2024-07-03 ENCOUNTER — PATIENT MESSAGE (OUTPATIENT)
Dept: FAMILY MEDICINE | Facility: CLINIC | Age: 34
End: 2024-07-03
Payer: COMMERCIAL

## 2024-07-03 DIAGNOSIS — M10.9 GOUT, UNSPECIFIED CAUSE, UNSPECIFIED CHRONICITY, UNSPECIFIED SITE: Primary | ICD-10-CM

## 2024-07-05 NOTE — TELEPHONE ENCOUNTER
Care Due:                  Date            Visit Type   Department     Provider  --------------------------------------------------------------------------------                                EP -                              PRIMARY      [facility] FAM[facility]  Last Visit: 03-      CARE ([facility])   MEDICINE       Jones Todd  Next Visit: None Scheduled  None         None Found                                                            Last  Test          Frequency    Reason                     Performed    Due Date  --------------------------------------------------------------------------------    CMP.........  12 months..  olmesartan...............  Not Found    Overdue    [employer] Embedded Care Due Messages. Reference number: [id number].   7/04/2024 9:06:31 PM CDT

## 2024-07-08 RX ORDER — ALLOPURINOL 300 MG/1
300 TABLET ORAL DAILY
Qty: 30 TABLET | Refills: 1 | Status: SHIPPED | OUTPATIENT
Start: 2024-07-08

## 2024-08-02 ENCOUNTER — OFFICE VISIT (OUTPATIENT)
Dept: FAMILY MEDICINE | Facility: CLINIC | Age: 34
End: 2024-08-02
Payer: COMMERCIAL

## 2024-08-02 ENCOUNTER — PATIENT MESSAGE (OUTPATIENT)
Dept: FAMILY MEDICINE | Facility: CLINIC | Age: 34
End: 2024-08-02

## 2024-08-02 VITALS
DIASTOLIC BLOOD PRESSURE: 86 MMHG | HEIGHT: 71 IN | OXYGEN SATURATION: 97 % | SYSTOLIC BLOOD PRESSURE: 146 MMHG | WEIGHT: 298.19 LBS | BODY MASS INDEX: 41.75 KG/M2 | HEART RATE: 102 BPM

## 2024-08-02 DIAGNOSIS — F41.8 DEPRESSION WITH ANXIETY: ICD-10-CM

## 2024-08-02 DIAGNOSIS — I10 ESSENTIAL HYPERTENSION: Primary | ICD-10-CM

## 2024-08-02 DIAGNOSIS — F90.0 ATTENTION DEFICIT HYPERACTIVITY DISORDER (ADHD), PREDOMINANTLY INATTENTIVE TYPE: ICD-10-CM

## 2024-08-02 DIAGNOSIS — Z11.4 ENCOUNTER FOR SCREENING FOR HIV: ICD-10-CM

## 2024-08-02 PROCEDURE — 99999 PR PBB SHADOW E&M-EST. PATIENT-LVL III: CPT | Mod: PBBFAC,,,

## 2024-08-02 RX ORDER — OLMESARTAN MEDOXOMIL 40 MG/1
40 TABLET ORAL DAILY
Qty: 90 TABLET | Refills: 3 | Status: SHIPPED | OUTPATIENT
Start: 2024-08-02 | End: 2025-08-02

## 2024-08-02 RX ORDER — SERTRALINE HYDROCHLORIDE 50 MG/1
50 TABLET, FILM COATED ORAL DAILY
Qty: 30 TABLET | Refills: 11 | Status: SHIPPED | OUTPATIENT
Start: 2024-08-02 | End: 2025-08-02

## 2024-08-02 RX ORDER — BUPROPION HYDROCHLORIDE 300 MG/1
300 TABLET ORAL DAILY
Qty: 30 TABLET | Refills: 11 | Status: SHIPPED | OUTPATIENT
Start: 2024-08-02 | End: 2025-08-02

## 2024-08-02 NOTE — PROGRESS NOTES
"[facility]  [address]  [place] [id number]    Subjective    Chief Complaint:   Chief Complaint   Patient presents with    Follow-up     Adhd medicine       History of Present Illness:     Gerardo Fernández is a(n) 34 y.o. male with past medical history as noted below who presents to the clinic today for follow up.    Hypertensive today. Olmesartan and amlodipine added LOV. Hasn't been checking at home.     Interested in restarting Adderall for attention deficit. Previous dose 20 mg worked well without side effect.     Mood is dysphoric. He started taking wellbutrin 100 mg bid but mistakenly discontinued sertraline. No SI/HI/AVH.     He wakes up frequently at night snoring. He has not been evaluated for SCOTT. He is not interested in sleep study or sleep medicine referral due to cost and stating he does not want to "go back and forth" to a sleep medicine specialist.     Essential hypertension  Rx-hydrochlorothiazide 12.5 mg daily, olmesartan 20 mg daily, amlodipine 10 mg daily  No CP / SOB      Depression, anxiety   Rx-bupropion 100 mg BID  Prev rx : Lexapro (?), Prozac (?), Effexor (ineffective), sertraline (discontinued mistakenly)  Mildly symptomatic but close to "the ledge"   No current SI      History of alcohol abuse   Correlates with depression / anxiety   Current intake : 6 pack 3 x per week   Why? : pt unsure / ? Social anxiety  Works at [employer]      ADHD   Prev Rx-Adderall (worked well)      History of gouty arthritis   Previous gout flare- > 1 year   Rx-allopurinol 300 mg     Weight management  Max weight-302 lb   Current weight 302 lb  Job : Working in wear house   Exercise : [employer]   Diet : Monitoring calorie intake     Nicotine Use   Vaping     [place] JESSE 2017   2/2 osteonecrosis   Limits exercise due to concern of wearing joint out.        Problem List:   Patient Active Problem List   Diagnosis    Obesity, morbid (more than 100 lbs over ideal " "weight or BMI > 40)    Attention deficit hyperactivity disorder (ADHD), predominantly inattentive type    Moderate episode of recurrent major depressive disorder    Benign essential HTN    Acute gout of right hand    Gout    Anxiety    History of ETOH abuse    History of bilateral hip arthroplasty       Current Outpatient Medications:   Current Outpatient Medications   Medication Instructions    allopurinoL (ZYLOPRIM) 300 mg, Oral, Daily    amLODIPine (NORVASC) 10 mg, Oral, Nightly    buPROPion (WELLBUTRIN SR) 100 mg, Oral, 2 times daily    dextroamphetamine-amphetamine (ADDERALL) 20 mg tablet 1 tablet, Daily    olmesartan (BENICAR) 20 mg, Oral, Daily    sertraline (ZOLOFT) 150 mg, Oral, Daily       Surgical History: No past surgical history on file.    Family History: No family history on file.    Allergies: Review of patient's allergies indicates:  No Known Allergies    Tobacco Status:   Tobacco Use: High Risk (8/2/2024)    Patient History     Smoking Tobacco Use: Every Day     Smokeless Tobacco Use: Never     Passive Exposure: Not on file       Sexual Activity:   Social History     Substance and Sexual Activity   Sexual Activity Not Currently       Alcohol Use:   Social History     Substance and Sexual Activity   Alcohol Use Never         Objective       Vitals:    08/02/24 1257   BP: (!) 142/88   Pulse: 102   SpO2: 97%   Weight: 135.2 kg (298 lb 2.8 oz)   Height: 5' 11" (1.803 m)       Review of Systems   Constitutional:  Negative for chills and fever.   Respiratory:  Negative for cough and shortness of breath.    Cardiovascular:  Negative for chest pain.   Psychiatric/Behavioral:  Positive for depression. Negative for suicidal ideas. The patient is nervous/anxious.        Physical Exam  Constitutional:       General: He is not in acute distress.     Appearance: Normal appearance. He is obese.   HENT:      Head: Normocephalic and atraumatic.   Cardiovascular:      Rate and Rhythm: Normal rate and regular rhythm. "      Heart sounds: Normal heart sounds. No murmur heard.  Pulmonary:      Effort: Pulmonary effort is normal. No respiratory distress.      Breath sounds: Normal breath sounds. No wheezing.   Skin:     General: Skin is warm.   Neurological:      Mental Status: He is alert and oriented to person, place, and time.   Psychiatric:         Behavior: Behavior normal.           Assessment and Plan:    1. Essential hypertension  -     olmesartan (BENICAR) 40 MG tablet; Take 1 tablet (40 mg total) by mouth once daily.  Dispense: 90 tablet; Refill: 3    2. Encounter for screening for HIV  -     HIV 1/2 Ag/Ab (4th Gen); Future; Expected date: 08/02/2024    3. Attention deficit hyperactivity disorder (ADHD), predominantly inattentive type    4. Depression with anxiety  Overview:  Formatting of this note might be different from the original.  Formatting of this note might be different from the original.  prozac trial    Orders:  -     buPROPion (WELLBUTRIN XL) 300 MG 24 hr tablet; Take 1 tablet (300 mg total) by mouth once daily.  Dispense: 30 tablet; Refill: 11  -     sertraline (ZOLOFT) 50 MG tablet; Take 1 tablet (50 mg total) by mouth once daily.  Dispense: 30 tablet; Refill: 11        Visit summary:    Gerardo Fernández presented today for follow up.    Blood pressure today is uncontrolled, increasing olmesartan 20 mg --> 40 mg. Patient will follow up for a blood pressure recheck with me for an office visit in 4 weeks.. Patient was given ER precautions for symptomatic hypertension or hypotension. Patient will follow up sooner if blood pressure is persistently elevated. They will keep a blood pressure log and bring it to their next appointment.       Would like hypertension controlled before treating ADD    Change wellbutrin to once daily dosing (pt preference) and add sertraline 50 mg. Patient mistakely stopped taking this. We discussed potential side effects of this medication and indications to discontinue this drug.  Patient verbalizes understanding.     Suspect SCOTT given symptoms, habitus, and uncontrolled hypertension. He defers home sleep study. Will let us know if he changes his mind.     Routine labs as ordered by PCP    Patient was instructed to report to ER if symptoms become severe.    Follow up: 4 weeks       María Canela PA-C    This note was created partially with voice dictation software and is prone to errors. This note has been reviewed by me but some errors are inevitable.

## 2024-09-05 ENCOUNTER — PATIENT MESSAGE (OUTPATIENT)
Dept: ADMINISTRATIVE | Facility: HOSPITAL | Age: 34
End: 2024-09-05
Payer: COMMERCIAL

## 2024-12-07 DIAGNOSIS — M10.9 GOUT, UNSPECIFIED CAUSE, UNSPECIFIED CHRONICITY, UNSPECIFIED SITE: ICD-10-CM

## 2024-12-08 NOTE — TELEPHONE ENCOUNTER
Care Due:                  Date            Visit Type   Department     Provider  --------------------------------------------------------------------------------                                EP -                              PRIMARY      [facility] FAMILY  Last Visit: 03-      CARE ([facility])   MEDICINE       Jones Todd  Next Visit: None Scheduled  None         None Found                                                            Last  Test          Frequency    Reason                     Performed    Due Date  --------------------------------------------------------------------------------    CBC.........  12 months..  allopurinoL..............  Not Found    Overdue    CMP.........  12 months..  allopurinoL..............  Not Found    Overdue    Uric Acid...  12 months..  allopurinoL..............  Not Found    Overdue    [employer] Embedded Care Due Messages. Reference number: [id number].   12/07/2024 8:06:58 PM CST

## 2024-12-09 RX ORDER — ALLOPURINOL 300 MG/1
TABLET ORAL
Qty: 90 TABLET | Refills: 0 | Status: SHIPPED | OUTPATIENT
Start: 2024-12-09

## 2024-12-09 NOTE — TELEPHONE ENCOUNTER
Refill Routing Note   Medication(s) are not appropriate for processing by [facility] for the following reason(s):        Required labs outdated  No active prescription written by provider    [facility] action(s):  Defer     Requires labs : Yes             Appointments  past 12m or future 3m with PCP    Date Provider   Last Visit   3/14/2024 Jones Todd MD   Next Visit   Visit date not found Jones Todd MD   ED visits in past 90 days: 0        Note composed:12:43 PM 12/09/2024

## 2025-03-12 DIAGNOSIS — M10.9 GOUT, UNSPECIFIED CAUSE, UNSPECIFIED CHRONICITY, UNSPECIFIED SITE: ICD-10-CM

## 2025-03-12 NOTE — TELEPHONE ENCOUNTER
Care Due:                  Date            Visit Type   Department     Provider  --------------------------------------------------------------------------------                                EP -                              PRIMARY      [facility] FAM[facility]  Last Visit: 03-      CARE ([facility])   MEDICINE       Jones Todd  Next Visit: None Scheduled  None         None Found                                                            Last  Test          Frequency    Reason                     Performed    Due Date  --------------------------------------------------------------------------------    Office Visit  15 months..  allopurinoL, amLODIPine..  03- 06-    CBC.........  12 months..  allopurinoL..............  Not Found    Overdue    CMP.........  12 months..  allopurinoL..............  Not Found    Overdue    Uric Acid...  12 months..  allopurinoL..............  Not Found    Overdue    [employer] Embedded Care Due Messages. Reference number: [id number].   3/12/2025 12:03:38 PM CDT

## 2025-03-13 RX ORDER — ALLOPURINOL 300 MG/1
TABLET ORAL
Qty: 90 TABLET | Refills: 0 | Status: SHIPPED | OUTPATIENT
Start: 2025-03-13

## 2025-03-13 NOTE — TELEPHONE ENCOUNTER
Refill Routing Note   Medication(s) are not appropriate for processing by [facility] for the following reason(s):        Required labs outdated    [facility] action(s):  Defer   Requires appointment : Yes   Requires labs : Yes             Appointments  past 12m or future 3m with PCP    Date Provider   Last Visit   3/14/2024 Jones Todd MD   Next Visit   Visit date not found Jones Todd MD   ED visits in past 90 days: 0        Note composed:7:37 PM 03/12/2025